# Patient Record
Sex: FEMALE
[De-identification: names, ages, dates, MRNs, and addresses within clinical notes are randomized per-mention and may not be internally consistent; named-entity substitution may affect disease eponyms.]

---

## 2024-05-23 ENCOUNTER — NON-APPOINTMENT (OUTPATIENT)
Age: 49
End: 2024-05-23

## 2024-07-29 ENCOUNTER — NON-APPOINTMENT (OUTPATIENT)
Age: 49
End: 2024-07-29

## 2024-09-04 ENCOUNTER — APPOINTMENT (OUTPATIENT)
Dept: ORTHOPEDIC SURGERY | Facility: CLINIC | Age: 49
End: 2024-09-04

## 2024-09-09 ENCOUNTER — APPOINTMENT (OUTPATIENT)
Dept: ORTHOPEDIC SURGERY | Facility: CLINIC | Age: 49
End: 2024-09-09

## 2024-09-09 DIAGNOSIS — M70.41 PREPATELLAR BURSITIS, RIGHT KNEE: ICD-10-CM

## 2024-09-09 DIAGNOSIS — Z78.9 OTHER SPECIFIED HEALTH STATUS: ICD-10-CM

## 2024-09-09 PROBLEM — Z00.00 ENCOUNTER FOR PREVENTIVE HEALTH EXAMINATION: Status: ACTIVE | Noted: 2024-09-09

## 2024-09-09 RX ORDER — NAPROXEN 500 MG/1
500 TABLET ORAL
Qty: 60 | Refills: 0 | Status: ACTIVE | COMMUNITY
Start: 2024-09-09 | End: 1900-01-01

## 2024-09-09 NOTE — DISCUSSION/SUMMARY
[de-identified] : We discussed aspiration, formal PT, a home exercise program, ice therapy and the role of NSAIDS (naproxen) for the pain. These modalities will improve the patient's functionality in their activities of daily life.  Risks, benefits and contraindications were discussed.  The patient will follow up in 6 weeks or sooner as needed.

## 2024-09-09 NOTE — PROCEDURE
[Other: ____] : [unfilled] [Right] : of the right [Knee] : knee [Pain] : pain [Inflammation] : inflammation [Alcohol] : alcohol [Betadine] : betadine [Ethyl Chloride sprayed topically] : ethyl chloride sprayed topically [Effusion] : effusion [] : Patient tolerated procedure well [Call if redness, pain or fever occur] : call if redness, pain or fever occur [Apply ice for 15min out of every hour for the next 12-24 hours as tolerated] : apply ice for 15 minutes out of every hour for the next 12-24 hours as tolerated [Patient was advised to rest the joint(s) for ____ days] : patient was advised to rest the joint(s) for [unfilled] days [Previous OTC use and PT nontherapeutic] : patient has tried OTC's including aspirin, Ibuprofen, Aleve, etc or prescription NSAIDS, and/or exercises at home and/or physical therapy without satisfactory response [Patient had decreased mobility in the joint] : patient had decreased mobility in the joint [Risks, benefits, alternatives discussed / Verbal consent obtained] : the risks benefits, and alternatives have been discussed, and verbal consent was obtained [All ultrasound images have been permanently captured and stored accordingly in our picture archiving and communication system] : All ultrasound images have been permanently captured and stored accordingly in our picture archiving and communication system [Visualization of the needle and placement of injection was performed without complication] : visualization of the needle and placement of injection was performed without complication [de-identified] : 75ccs [de-identified] : Blood tinged fluid [de-identified] : Aspiration of a prepatellar bursitis

## 2024-09-09 NOTE — PHYSICAL EXAM
[5___] : hamstring 5[unfilled]/5 [Right] : right knee [Lateral] : lateral [[place]] : skyline [AP Standing] : anteroposterior standing [There are no fractures, subluxations or dislocations. No significant abnormalities are seen] : There are no fractures, subluxations or dislocations. No significant abnormalities are seen [] : no extensor lag [TWNoteComboBox7] : flexion 60 degrees

## 2024-09-09 NOTE — HISTORY OF PRESENT ILLNESS
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 She presents with a swollen knee and lower leg. She notes that she did have a previous aspiration 9/4/2024. She takes NSAIDs and is wearing a compression sleeve.

## 2024-09-23 ENCOUNTER — APPOINTMENT (OUTPATIENT)
Dept: ORTHOPEDIC SURGERY | Facility: CLINIC | Age: 49
End: 2024-09-23
Payer: OTHER MISCELLANEOUS

## 2024-09-23 PROCEDURE — 99204 OFFICE O/P NEW MOD 45 MIN: CPT

## 2024-09-23 RX ORDER — SULFAMETHOXAZOLE AND TRIMETHOPRIM 800; 160 MG/1; MG/1
800-160 TABLET ORAL TWICE DAILY
Qty: 14 | Refills: 1 | Status: ACTIVE | COMMUNITY
Start: 2024-09-23 | End: 1900-01-01

## 2024-09-23 RX ORDER — CEPHALEXIN 500 MG/1
500 CAPSULE ORAL 3 TIMES DAILY
Qty: 30 | Refills: 0 | Status: DISCONTINUED | COMMUNITY
Start: 2024-09-23 | End: 2024-09-23

## 2024-09-23 NOTE — HISTORY OF PRESENT ILLNESS
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 She presents with a swollen knee and lower leg. She notes that she did have a previous aspiration 9/4/2024 with outside provider. She underwent a subsequent aspiration on 9/9/24 with significant improvement. She takes NSAIDs and is wearing a compression sleeve.

## 2024-09-23 NOTE — WORK
[Other: ___] : [unfilled] [Was the competent medical cause of the injury] : was the competent medical cause of the injury [Are consistent with the injury] : are consistent with the injury [Consistent with my objective findings] : consistent with my objective findings [Partial] : partial [Does not reveal pre-existing condition(s) that may affect treatment/prognosis] : does not reveal pre-existing condition(s) that may affect treatment/prognosis [Kneeling] : kneeling [Patient] : patient [No Rx restrictions] : No Rx restrictions. [I provided the services listed above] :  I provided the services listed above. [FreeTextEntry1] : good

## 2024-09-23 NOTE — DISCUSSION/SUMMARY
[Medication Risks Reviewed] : Medication risks reviewed [de-identified] : We discussed abx prophylaxis,  formal PT, a home exercise program, ice therapy and the role of NSAIDS (naproxen) for the pain. These modalities will improve the patient's functionality in their activities of daily life.  Risks, benefits and contraindications were discussed.  The patient will follow up in 6 weeks or sooner as needed.

## 2024-09-23 NOTE — PHYSICAL EXAM
[5___] : hamstring 5[unfilled]/5 [Right] : right knee [Lateral] : lateral [[place]] : skyline [AP Standing] : anteroposterior standing [There are no fractures, subluxations or dislocations. No significant abnormalities are seen] : There are no fractures, subluxations or dislocations. No significant abnormalities are seen [] : no extensor lag [FreeTextEntry3] : small area superior to tibial tubercle with scant purulent discharge [TWNoteComboBox7] : flexion 60 degrees

## 2024-09-30 ENCOUNTER — APPOINTMENT (OUTPATIENT)
Dept: ORTHOPEDIC SURGERY | Facility: CLINIC | Age: 49
End: 2024-09-30
Payer: OTHER MISCELLANEOUS

## 2024-09-30 DIAGNOSIS — M70.41 PREPATELLAR BURSITIS, RIGHT KNEE: ICD-10-CM

## 2024-09-30 PROCEDURE — 99213 OFFICE O/P EST LOW 20 MIN: CPT

## 2024-09-30 NOTE — DISCUSSION/SUMMARY
[de-identified] : We discussed formal PT to work on ROM, a continued home exercise program, ice therapy and the role of NSAIDS for the pain. These modalities will improve the patient's functionality in their activities of daily life.  Risks, benefits and contraindications were discussed.  The patient will follow up in 6 weeks or sooner as needed.

## 2024-09-30 NOTE — PHYSICAL EXAM
[5___] : hamstring 5[unfilled]/5 [Right] : right knee [Lateral] : lateral [[place]] : skyline [AP Standing] : anteroposterior standing [There are no fractures, subluxations or dislocations. No significant abnormalities are seen] : There are no fractures, subluxations or dislocations. No significant abnormalities are seen [] : mildly antalgic [FreeTextEntry3] : pinhole area of clear fluid without purulent discharge, no erythema [TWNoteComboBox7] : flexion 120 degrees

## 2024-10-30 ENCOUNTER — APPOINTMENT (OUTPATIENT)
Dept: ORTHOPEDIC SURGERY | Facility: CLINIC | Age: 49
End: 2024-10-30

## 2025-01-06 ENCOUNTER — APPOINTMENT (OUTPATIENT)
Dept: ORTHOPEDIC SURGERY | Facility: CLINIC | Age: 50
End: 2025-01-06
Payer: OTHER MISCELLANEOUS

## 2025-01-06 DIAGNOSIS — M70.41 PREPATELLAR BURSITIS, RIGHT KNEE: ICD-10-CM

## 2025-01-06 PROCEDURE — 99213 OFFICE O/P EST LOW 20 MIN: CPT

## 2025-03-10 ENCOUNTER — APPOINTMENT (OUTPATIENT)
Dept: ORTHOPEDIC SURGERY | Facility: CLINIC | Age: 50
End: 2025-03-10

## 2025-03-24 ENCOUNTER — APPOINTMENT (OUTPATIENT)
Dept: ORTHOPEDIC SURGERY | Facility: CLINIC | Age: 50
End: 2025-03-24
Payer: OTHER MISCELLANEOUS

## 2025-03-24 DIAGNOSIS — M70.41 PREPATELLAR BURSITIS, RIGHT KNEE: ICD-10-CM

## 2025-03-24 PROCEDURE — 99213 OFFICE O/P EST LOW 20 MIN: CPT

## 2025-08-25 ENCOUNTER — APPOINTMENT (OUTPATIENT)
Dept: ORTHOPEDIC SURGERY | Facility: CLINIC | Age: 50
End: 2025-08-25
Payer: OTHER MISCELLANEOUS

## 2025-08-25 DIAGNOSIS — M70.41 PREPATELLAR BURSITIS, RIGHT KNEE: ICD-10-CM

## 2025-08-25 PROCEDURE — 99243 OFF/OP CNSLTJ NEW/EST LOW 30: CPT
